# Patient Record
Sex: FEMALE | URBAN - METROPOLITAN AREA
[De-identification: names, ages, dates, MRNs, and addresses within clinical notes are randomized per-mention and may not be internally consistent; named-entity substitution may affect disease eponyms.]

---

## 2023-02-17 ENCOUNTER — TELEPHONE (OUTPATIENT)
Dept: FAMILY MEDICINE CLINIC | Age: 18
End: 2023-02-17

## 2023-02-17 NOTE — TELEPHONE ENCOUNTER
Patient's Mom see's Dr. Matias Mail    Is it ok for her to establish with Dr. Florencio Akhtar?     Please Advise

## 2023-02-17 NOTE — TELEPHONE ENCOUNTER
----- Message from [address] [facility] sent at 2/17/2023  3:50 PM EST -----  Subject: Appointment Request    Reason for Call: Established Patient Appointment needed: New Patient   Request Appointment    QUESTIONS    Reason for appointment request? No appointments available during search     Additional Information for Provider? Pt would like to est care with Dr. Tracie Wilson. Pt [facility] see's Dr. Tracie Wilson.  Pt has caresource  ---------------------------------------------------------------------------  --------------  Ivelisse Cummings [facility]  [phone]; OK to leave message on voicemail  ---------------------------------------------------------------------------  --------------  SCRIPT ANSWERS  COVID Screen: Bernardo Kelly

## 2023-03-23 ENCOUNTER — OFFICE VISIT (OUTPATIENT)
Dept: FAMILY MEDICINE CLINIC | Age: 18
End: 2023-03-23

## 2023-03-23 VITALS
BODY MASS INDEX: 23.04 KG/M2 | TEMPERATURE: 98.4 F | SYSTOLIC BLOOD PRESSURE: 110 MMHG | DIASTOLIC BLOOD PRESSURE: 64 MMHG | WEIGHT: 152 LBS | OXYGEN SATURATION: 98 % | HEIGHT: 68 IN | HEART RATE: 70 BPM | RESPIRATION RATE: 14 BRPM

## 2023-03-23 DIAGNOSIS — Z00.00 WELL ADULT HEALTH CHECK: Primary | ICD-10-CM

## 2023-03-23 DIAGNOSIS — L50.9 URTICARIA: ICD-10-CM

## 2023-03-23 DIAGNOSIS — Z91.018 MULTIPLE FOOD ALLERGIES: ICD-10-CM

## 2023-03-23 RX ORDER — CETIRIZINE HYDROCHLORIDE 10 MG/1
10 TABLET ORAL 2 TIMES DAILY PRN
Qty: 60 TABLET | Refills: 5 | Status: SHIPPED | OUTPATIENT
Start: 2023-03-23

## 2023-03-23 RX ORDER — DIPHENHYDRAMINE HCL 25 MG
25 TABLET ORAL EVERY 6 HOURS PRN
COMMUNITY

## 2023-03-23 RX ORDER — IBUPROFEN 600 MG/1
600 TABLET ORAL EVERY 6 HOURS PRN
COMMUNITY
Start: 2021-09-17

## 2023-03-23 RX ORDER — NORETHINDRONE ACETATE/ETHINYL ESTRADIOL AND FERROUS FUMARATE 1MG-20(21)
KIT ORAL
COMMUNITY
Start: 2023-02-06

## 2023-03-23 SDOH — ECONOMIC STABILITY: INCOME INSECURITY: HOW HARD IS IT FOR YOU TO PAY FOR THE VERY BASICS LIKE FOOD, HOUSING, MEDICAL CARE, AND HEATING?: NOT HARD AT ALL

## 2023-03-23 SDOH — ECONOMIC STABILITY: HOUSING INSECURITY
IN THE LAST 12 MONTHS, WAS THERE A TIME WHEN YOU DID NOT HAVE A STEADY PLACE TO SLEEP OR SLEPT IN A SHELTER (INCLUDING NOW)?: NO

## 2023-03-23 SDOH — ECONOMIC STABILITY: FOOD INSECURITY: WITHIN THE PAST 12 MONTHS, THE FOOD YOU BOUGHT JUST DIDN'T LAST AND YOU DIDN'T HAVE MONEY TO GET MORE.: NEVER TRUE

## 2023-03-23 SDOH — ECONOMIC STABILITY: FOOD INSECURITY: WITHIN THE PAST 12 MONTHS, YOU WORRIED THAT YOUR FOOD WOULD RUN OUT BEFORE YOU GOT MONEY TO BUY MORE.: NEVER TRUE

## 2023-03-23 ASSESSMENT — PATIENT HEALTH QUESTIONNAIRE - PHQ9
SUM OF ALL RESPONSES TO PHQ QUESTIONS 1-9: 0
SUM OF ALL RESPONSES TO PHQ QUESTIONS 1-9: 0
1. LITTLE INTEREST OR PLEASURE IN DOING THINGS: 0
SUM OF ALL RESPONSES TO PHQ QUESTIONS 1-9: 0
SUM OF ALL RESPONSES TO PHQ QUESTIONS 1-9: 0
SUM OF ALL RESPONSES TO PHQ9 QUESTIONS 1 & 2: 0
2. FEELING DOWN, DEPRESSED OR HOPELESS: 0

## 2023-03-23 NOTE — PROGRESS NOTES
gynecologist to schedule PAP smear. Ask GYN to fax result to Dr. Carleen Feliz at [phone]. Continue fiber and water. May also take Miralax OTC once a day as needed.   See allergist.

## 2023-03-23 NOTE — PATIENT INSTRUCTIONS
NEXT APPOINTMENT: Please schedule annual complete physical (30 minutes) in one year with Dr. Leonor Pretty or her NP, Raquel Davis. Would get new bivalent COVID booster soon. Has better coverage for Omicron variant. Separate from other vaccines by at least 2 weeks. Take benadryl as needed OR zyrtec/certrizine 10 mg twice a day. Please call gynecologist to schedule PAP smear. Ask GYN to fax result to Dr. Leonor Pretty at [phone]. Continue fiber and water. May also take Miralax OTC once a day as needed.   See allergist.

## 2023-06-06 ENCOUNTER — OFFICE VISIT (OUTPATIENT)
Dept: FAMILY MEDICINE CLINIC | Age: 18
End: 2023-06-06
Payer: COMMERCIAL

## 2023-06-06 VITALS
SYSTOLIC BLOOD PRESSURE: 106 MMHG | DIASTOLIC BLOOD PRESSURE: 70 MMHG | TEMPERATURE: 98.4 F | HEART RATE: 72 BPM | OXYGEN SATURATION: 98 % | HEIGHT: 68 IN | BODY MASS INDEX: 23.19 KG/M2 | WEIGHT: 153 LBS

## 2023-06-06 DIAGNOSIS — R11.0 CHRONIC NAUSEA: Primary | ICD-10-CM

## 2023-06-06 PROCEDURE — G8420 CALC BMI NORM PARAMETERS: HCPCS

## 2023-06-06 PROCEDURE — 1036F TOBACCO NON-USER: CPT

## 2023-06-06 PROCEDURE — G8427 DOCREV CUR MEDS BY ELIG CLIN: HCPCS

## 2023-06-06 PROCEDURE — 99213 OFFICE O/P EST LOW 20 MIN: CPT

## 2023-06-06 RX ORDER — FAMOTIDINE 20 MG/1
20 TABLET, FILM COATED ORAL 2 TIMES DAILY
Qty: 180 TABLET | Refills: 1 | Status: SHIPPED | OUTPATIENT
Start: 2023-06-06

## 2023-06-06 ASSESSMENT — ENCOUNTER SYMPTOMS
NAUSEA: 1
ABDOMINAL PAIN: 0
SHORTNESS OF BREATH: 0
BLOOD IN STOOL: 0
DIARRHEA: 0
CONSTIPATION: 1
VOMITING: 0
ABDOMINAL DISTENTION: 0

## 2023-06-06 NOTE — PATIENT INSTRUCTIONS
Prebiotic and probiotic  Famotidine 20 mg twice daily  Take vitamins in the evening instead of the morning

## 2023-06-06 NOTE — PROGRESS NOTES
distention, abdominal pain, blood in stool, diarrhea and vomiting. Neurological:  Negative for dizziness and headaches. Vitals:    06/06/23 1344   BP: 106/70   Site: Left Upper Arm   Position: Sitting   Cuff Size: Medium Adult   Pulse: 72   Temp: 98.4 °F (36.9 °C)   TempSrc: Oral   SpO2: 98%   Weight: 153 lb (69.4 kg)   Height: 5' 7.75\" (1.721 m)       Body mass index is 23.44 kg/m². Wt Readings from Last 3 Encounters:   06/06/23 153 lb (69.4 kg)   03/23/23 152 lb (68.9 kg)       BP Readings from Last 3 Encounters:   06/06/23 106/70   03/23/23 110/64       Physical Exam  Constitutional:       Appearance: Normal appearance. She is normal weight. Pulmonary:      Effort: Pulmonary effort is normal.   Abdominal:      General: Abdomen is flat. There is no distension. Palpations: Abdomen is soft. Tenderness: There is no abdominal tenderness. Neurological:      Mental Status: She is alert. [address] [place] [address] was seen today for nausea. Diagnoses and all orders for this visit:    Chronic nausea  -     famotidine (PEPCID) 20 MG tablet; Take 1 tablet by mouth 2 times daily    Continue fiber, Prebiotic and probiotic daily  Famotidine 20 mg twice daily  Take vitamins in the evening instead of the morning  Increase water intake to ensure having regular BMs  Continue PRN benadryl in evening  If not improving, will refer to GI    Return if symptoms worsen or fail to improve.

## 2023-07-07 ENCOUNTER — OFFICE VISIT (OUTPATIENT)
Dept: FAMILY MEDICINE CLINIC | Age: 18
End: 2023-07-07
Payer: COMMERCIAL

## 2023-07-07 VITALS
SYSTOLIC BLOOD PRESSURE: 110 MMHG | BODY MASS INDEX: 23.34 KG/M2 | OXYGEN SATURATION: 99 % | WEIGHT: 154 LBS | HEART RATE: 74 BPM | HEIGHT: 68 IN | DIASTOLIC BLOOD PRESSURE: 70 MMHG

## 2023-07-07 DIAGNOSIS — R11.0 CHRONIC NAUSEA: ICD-10-CM

## 2023-07-07 DIAGNOSIS — R11.0 CHRONIC NAUSEA: Primary | ICD-10-CM

## 2023-07-07 LAB
ALBUMIN SERPL-MCNC: 4.7 G/DL (ref 3.4–5)
ALBUMIN/GLOB SERPL: 1.8 {RATIO} (ref 1.1–2.2)
ALP SERPL-CCNC: 33 U/L (ref 40–129)
ALT SERPL-CCNC: 10 U/L (ref 10–40)
ANION GAP SERPL CALCULATED.3IONS-SCNC: 12 MMOL/L (ref 3–16)
AST SERPL-CCNC: 16 U/L (ref 15–37)
BASOPHILS # BLD: 0 K/UL (ref 0–0.2)
BASOPHILS NFR BLD: 0.6 %
BILIRUB SERPL-MCNC: 0.3 MG/DL (ref 0–1)
BUN SERPL-MCNC: 15 MG/DL (ref 7–20)
CALCIUM SERPL-MCNC: 9.6 MG/DL (ref 8.3–10.6)
CHLORIDE SERPL-SCNC: 100 MMOL/L (ref 99–110)
CO2 SERPL-SCNC: 24 MMOL/L (ref 21–32)
CREAT SERPL-MCNC: 0.6 MG/DL (ref 0.6–1.1)
DEPRECATED RDW RBC AUTO: 13.8 % (ref 12.4–15.4)
EOSINOPHIL # BLD: 0.1 K/UL (ref 0–0.6)
EOSINOPHIL NFR BLD: 1.9 %
GFR SERPLBLD CREATININE-BSD FMLA CKD-EPI: >60 ML/MIN/{1.73_M2}
GLUCOSE SERPL-MCNC: 76 MG/DL (ref 70–99)
HCT VFR BLD AUTO: 40 % (ref 36–48)
HGB BLD-MCNC: 13.7 G/DL (ref 12–16)
LIPASE SERPL-CCNC: 22 U/L (ref 13–60)
LYMPHOCYTES # BLD: 2 K/UL (ref 1–5.1)
LYMPHOCYTES NFR BLD: 41.6 %
MCH RBC QN AUTO: 28 PG (ref 26–34)
MCHC RBC AUTO-ENTMCNC: 34.3 G/DL (ref 31–36)
MCV RBC AUTO: 81.6 FL (ref 80–100)
MONOCYTES # BLD: 0.3 K/UL (ref 0–1.3)
MONOCYTES NFR BLD: 6 %
NEUTROPHILS # BLD: 2.4 K/UL (ref 1.7–7.7)
NEUTROPHILS NFR BLD: 49.9 %
PLATELET # BLD AUTO: 219 K/UL (ref 135–450)
PMV BLD AUTO: 8.9 FL (ref 5–10.5)
POTASSIUM SERPL-SCNC: 4.3 MMOL/L (ref 3.5–5.1)
PROT SERPL-MCNC: 7.3 G/DL (ref 6.4–8.2)
RBC # BLD AUTO: 4.9 M/UL (ref 4–5.2)
SODIUM SERPL-SCNC: 136 MMOL/L (ref 136–145)
WBC # BLD AUTO: 4.9 K/UL (ref 4–11)

## 2023-07-07 PROCEDURE — 1036F TOBACCO NON-USER: CPT | Performed by: FAMILY MEDICINE

## 2023-07-07 PROCEDURE — G8420 CALC BMI NORM PARAMETERS: HCPCS | Performed by: FAMILY MEDICINE

## 2023-07-07 PROCEDURE — 99213 OFFICE O/P EST LOW 20 MIN: CPT | Performed by: FAMILY MEDICINE

## 2023-07-07 PROCEDURE — G8427 DOCREV CUR MEDS BY ELIG CLIN: HCPCS | Performed by: FAMILY MEDICINE

## 2023-07-07 RX ORDER — DICYCLOMINE HCL 20 MG
20 TABLET ORAL 3 TIMES DAILY
Qty: 90 TABLET | Refills: 2 | Status: SHIPPED | OUTPATIENT
Start: 2023-07-07 | End: 2023-10-05

## 2023-07-07 NOTE — PATIENT INSTRUCTIONS
INSTRUCTIONS  PLEASE GET BLOODWORK DRAWN TODAY ON FIRST FLOOR in 170. Take orders with you. RESULTS- most blood tests back in couple days. We will call you if any problems. If bloodwork good, you will get letter in mail or notified thru [facility] (if signed up) within 2 weeks. If you do not, please call office. OK to stop famotidine. Benadryl- try a half tab to see if works but less sedation. Trial bentyl three times per day for a month.  Breakfast, lunch, bedtime

## 2023-07-26 ENCOUNTER — TELEPHONE (OUTPATIENT)
Dept: FAMILY MEDICINE CLINIC | Age: 18
End: 2023-07-26

## 2023-07-26 DIAGNOSIS — R11.0 CHRONIC NAUSEA: Primary | ICD-10-CM

## 2023-07-26 RX ORDER — ONDANSETRON 4 MG/1
4 TABLET, ORALLY DISINTEGRATING ORAL EVERY 8 HOURS PRN
Qty: 12 TABLET | Refills: 0 | Status: SHIPPED | OUTPATIENT
Start: 2023-07-26 | End: 2023-08-01 | Stop reason: SDUPTHER

## 2023-07-26 NOTE — TELEPHONE ENCOUNTER
Pt was seen 7.7.2023 for nausea   She was prescribed Dicyclomine 20mg  She was taking it and it started to make her dizzy  It didn't work with the nausea  Pt stopped taking it all together  Her nausea has gotten worse  She stated that starting on Monday afternoon it had calmed down a little bit but then stated back up and felt bad for about 5 hours before it calmed down   Pharm confirmed  [facility] [address]       Does go to GI on 8.10.2023  She does not think she can wait until that date to get anything done with it getting worse.      Please advise

## 2023-07-31 DIAGNOSIS — R11.0 CHRONIC NAUSEA: ICD-10-CM

## 2023-07-31 RX ORDER — ONDANSETRON 4 MG/1
4 TABLET, ORALLY DISINTEGRATING ORAL EVERY 8 HOURS PRN
Qty: 12 TABLET | Refills: 0 | OUTPATIENT
Start: 2023-07-31

## 2023-08-01 RX ORDER — ONDANSETRON 4 MG/1
4 TABLET, ORALLY DISINTEGRATING ORAL EVERY 8 HOURS PRN
Qty: 30 TABLET | Refills: 0 | Status: SHIPPED | OUTPATIENT
Start: 2023-08-01 | End: 2023-08-11

## 2023-08-01 NOTE — TELEPHONE ENCOUNTER
Pt calling in, she is out of Zofran now. She stretched it out since it was as needed. She would like to know if she can get a refill sent into pharm. She is not able to get into GI until 8/10/2023.   Pharm [facility]     Please advise  Pt can be reached at [phone]

## 2023-08-01 NOTE — TELEPHONE ENCOUNTER
This was pended, but she only got 12 tabs. Looks like that amount only lasted 6 days. Her appt is on 8/10/23. Do you want to give her more than 12?

## 2024-03-22 ENCOUNTER — OFFICE VISIT (OUTPATIENT)
Dept: FAMILY MEDICINE CLINIC | Age: 19
End: 2024-03-22
Payer: COMMERCIAL

## 2024-03-22 VITALS
BODY MASS INDEX: 25.7 KG/M2 | RESPIRATION RATE: 14 BRPM | HEIGHT: 68 IN | DIASTOLIC BLOOD PRESSURE: 68 MMHG | HEART RATE: 82 BPM | OXYGEN SATURATION: 99 % | WEIGHT: 169.6 LBS | SYSTOLIC BLOOD PRESSURE: 114 MMHG

## 2024-03-22 DIAGNOSIS — G44.219 EPISODIC TENSION-TYPE HEADACHE, NOT INTRACTABLE: Primary | ICD-10-CM

## 2024-03-22 DIAGNOSIS — R11.0 CHRONIC NAUSEA: ICD-10-CM

## 2024-03-22 PROCEDURE — 99213 OFFICE O/P EST LOW 20 MIN: CPT | Performed by: FAMILY MEDICINE

## 2024-03-22 PROCEDURE — G8484 FLU IMMUNIZE NO ADMIN: HCPCS | Performed by: FAMILY MEDICINE

## 2024-03-22 PROCEDURE — G8419 CALC BMI OUT NRM PARAM NOF/U: HCPCS | Performed by: FAMILY MEDICINE

## 2024-03-22 PROCEDURE — G8427 DOCREV CUR MEDS BY ELIG CLIN: HCPCS | Performed by: FAMILY MEDICINE

## 2024-03-22 PROCEDURE — 1036F TOBACCO NON-USER: CPT | Performed by: FAMILY MEDICINE

## 2024-03-22 ASSESSMENT — PATIENT HEALTH QUESTIONNAIRE - PHQ9
SUM OF ALL RESPONSES TO PHQ9 QUESTIONS 1 & 2: 0
SUM OF ALL RESPONSES TO PHQ QUESTIONS 1-9: 0
1. LITTLE INTEREST OR PLEASURE IN DOING THINGS: NOT AT ALL
2. FEELING DOWN, DEPRESSED OR HOPELESS: NOT AT ALL

## 2024-03-22 NOTE — PROGRESS NOTES
(Site: Right Upper Arm, Position: Sitting, Cuff Size: Medium Adult)   Pulse 82   Resp 14   Ht 1.715 m (5' 7.5\")   Wt 76.9 kg (169 lb 9.6 oz)   SpO2 99%   BMI 26.17 kg/m²   BP Readings from Last 5 Encounters:   03/22/24 114/68   07/07/23 110/70   06/12/23 115/81   06/06/23 106/70   03/23/23 110/64     Wt Readings from Last 5 Encounters:   03/22/24 76.9 kg (169 lb 9.6 oz)   07/07/23 69.9 kg (154 lb)   06/12/23 69.4 kg (153 lb)   06/06/23 69.4 kg (153 lb)   03/23/23 68.9 kg (152 lb)      GENERAL:   well-developed, well-nourished, alert, no distress.     LUNGS:    Breathing unlabored  clear to auscultation bilaterally and good air movement  CARDIOVASC:   regular rate and rhythm  SKIN: warm and dry     Assessment and Plan:      Diagnosis Orders   1. Episodic tension-type headache, not intractable        2. Chronic nausea        Nausea resolved on TCA per GI     INSTRUCTIONS  NEXT APPOINTMENT: Please schedule annual complete physical (30 minutes) in 3 months with Eli Sung (Nurse Practitioner).   Change to ibuprofen 600 mg twice a day if needed.   Watch for triggers.  If severe, try Excedrin.  Do not use same pain med for headache more than 3 days in a row.  May be start of early migraines or just muscle tension headaches.  Be seen 2 weeks if not resolving.

## 2024-03-22 NOTE — PATIENT INSTRUCTIONS
Start your day with a healthy breakfast. Eat lunch and dinner at about the same time every day.   Exercise regularly. Physical activity causes your body to release chemicals that block pain signals to your brain. With your doctor's OK, choose activities you enjoy -- such as walking, swimming or cycling. To avoid injury, start slowly.   Reduce stress. Get organized. Simplify your schedule. Plan ahead. When the going gets tough, try to stay positive.   Relax. Try yoga, meditation or relaxation exercises. Set aside time to slow down. Listen to music, read a book or take a hot bath.   Quit smoking. If you smoke, talk to your doctor about quitting. Smoking can trigger headaches or make them worse.

## 2024-06-24 ENCOUNTER — OFFICE VISIT (OUTPATIENT)
Dept: FAMILY MEDICINE CLINIC | Age: 19
End: 2024-06-24
Payer: COMMERCIAL

## 2024-06-24 VITALS
OXYGEN SATURATION: 99 % | BODY MASS INDEX: 26.37 KG/M2 | DIASTOLIC BLOOD PRESSURE: 78 MMHG | WEIGHT: 174 LBS | SYSTOLIC BLOOD PRESSURE: 110 MMHG | HEART RATE: 81 BPM | HEIGHT: 68 IN

## 2024-06-24 DIAGNOSIS — R11.0 CHRONIC NAUSEA: ICD-10-CM

## 2024-06-24 DIAGNOSIS — Z00.00 ENCOUNTER FOR WELL ADULT EXAM WITHOUT ABNORMAL FINDINGS: Primary | ICD-10-CM

## 2024-06-24 PROBLEM — L50.9 URTICARIA: Status: RESOLVED | Noted: 2023-03-23 | Resolved: 2024-06-24

## 2024-06-24 PROCEDURE — 99395 PREV VISIT EST AGE 18-39: CPT

## 2024-06-24 RX ORDER — ACETAMINOPHEN 500 MG
500 TABLET ORAL EVERY 6 HOURS PRN
COMMUNITY

## 2024-06-24 NOTE — PROGRESS NOTES
History and Physical      Jena Jackson  YOB: 2005    Date of Service:  6/24/2024    Chief Complaint:   Jena Jackson is a 19 y.o. female who presents for complete physical examination.    HPI: Here for annual physical.    Concerns: on her feet all day at work on [employer] and is expected to wear dress shoes.  She is having  worsening pain in the ball of her foot, and she is getting blisters.  She is able to wear gym shoes with doctors note.  She is also able to carry a water bottle with permission from a doctor.    No other issues or concerns.    Taking cetirizine PRN for allergies.    Alcolhol: rare social use  Tobacco: denies  Drug use: denies    Wt Readings from Last 3 Encounters:   06/24/24 78.9 kg (174 lb)   03/22/24 76.9 kg (169 lb 9.6 oz)   07/07/23 69.9 kg (154 lb)     BP Readings from Last 3 Encounters:   06/24/24 110/78   03/22/24 114/68   07/07/23 110/70       Patient Active Problem List   Diagnosis    Multiple food allergies    Chronic nausea     Chronic nausea- following with GI- on nortriptyline 50 mg with good relief in symptoms  Rare use of zofran    Preventive Care:  Health Maintenance   Topic Date Due    COVID-19 Vaccine (3 - 2023-24 season) 09/01/2023    DTaP/Tdap/Td vaccine (8 - Td or Tdap) 03/19/2024    Chlamydia/GC screen  06/24/2025 (Originally 1/10/2019)    Pap smear  06/24/2025 (Originally 1/10/2024)    Flu vaccine (Season Ended) 08/01/2024    Depression Screen  03/22/2025    Hepatitis A vaccine  Completed    Hepatitis B vaccine  Completed    Hib vaccine  Completed    HPV vaccine  Completed    Polio vaccine  Completed    Varicella vaccine  Completed    Meningococcal (ACWY) vaccine  Completed    Pneumococcal 0-64 years Vaccine  Completed    Hepatitis C screen  Discontinued    HIV screen  Discontinued      Hx abnormal PAP: no  Sexual activity: single partner, contraception - OCP (estrogen/progesterone)- per GYN  Self-breast exams: yes  Last eye exam: 2023,